# Patient Record
Sex: FEMALE | Race: WHITE | NOT HISPANIC OR LATINO | Employment: FULL TIME | URBAN - NONMETROPOLITAN AREA
[De-identification: names, ages, dates, MRNs, and addresses within clinical notes are randomized per-mention and may not be internally consistent; named-entity substitution may affect disease eponyms.]

---

## 2023-07-08 ENCOUNTER — HOSPITAL ENCOUNTER (EMERGENCY)
Facility: HOSPITAL | Age: 48
Discharge: HOME | End: 2023-07-08
Attending: STUDENT IN AN ORGANIZED HEALTH CARE EDUCATION/TRAINING PROGRAM | Admitting: STUDENT IN AN ORGANIZED HEALTH CARE EDUCATION/TRAINING PROGRAM
Payer: COMMERCIAL

## 2023-07-08 VITALS
BODY MASS INDEX: 24.55 KG/M2 | RESPIRATION RATE: 16 BRPM | DIASTOLIC BLOOD PRESSURE: 89 MMHG | SYSTOLIC BLOOD PRESSURE: 139 MMHG | HEIGHT: 61 IN | TEMPERATURE: 97.1 F | WEIGHT: 130 LBS | OXYGEN SATURATION: 99 % | HEART RATE: 86 BPM

## 2023-07-08 DIAGNOSIS — M54.50 LOW BACK PAIN: Primary | ICD-10-CM

## 2023-07-08 PROCEDURE — 99282 EMERGENCY DEPT VISIT SF MDM: CPT

## 2023-07-08 PROCEDURE — 96372 THER/PROPH/DIAG INJ SC/IM: CPT

## 2023-07-08 RX ORDER — ONDANSETRON 4 MG/1
4 TABLET, FILM COATED ORAL EVERY 8 HOURS PRN
COMMUNITY

## 2023-07-08 RX ORDER — PREDNISONE 20 MG/1
40 TABLET ORAL DAILY
Qty: 10 TABLET | Refills: 0 | Status: SHIPPED | OUTPATIENT
Start: 2023-07-08 | End: 2023-07-13

## 2023-07-08 RX ORDER — OXYCODONE HYDROCHLORIDE 5 MG/1
5 TABLET ORAL EVERY 6 HOURS PRN
Qty: 12 TABLET | Refills: 0 | Status: SHIPPED | OUTPATIENT
Start: 2023-07-08 | End: 2023-07-11

## 2023-07-08 RX ORDER — VENLAFAXINE HYDROCHLORIDE 150 MG/1
150 CAPSULE, EXTENDED RELEASE ORAL DAILY
COMMUNITY

## 2023-07-08 RX ORDER — HYDROXYZINE HYDROCHLORIDE 25 MG/1
1 TABLET, FILM COATED ORAL 3 TIMES DAILY PRN
COMMUNITY
Start: 2023-06-14

## 2023-07-08 RX ORDER — DEXAMETHASONE SODIUM PHOSPHATE 10 MG/ML
10 INJECTION, SOLUTION INTRAMUSCULAR; INTRAVENOUS ONCE
Status: COMPLETED | OUTPATIENT
Start: 2023-07-08 | End: 2023-07-08

## 2023-07-08 RX ORDER — CYCLOBENZAPRINE HCL 10 MG
10 TABLET ORAL ONCE
Status: COMPLETED | OUTPATIENT
Start: 2023-07-08 | End: 2023-07-08

## 2023-07-08 RX ORDER — CYCLOBENZAPRINE HCL 10 MG
10 TABLET ORAL 2 TIMES DAILY PRN
Qty: 20 TABLET | Refills: 0 | Status: SHIPPED | OUTPATIENT
Start: 2023-07-08

## 2023-07-08 RX ORDER — BUPROPION HYDROCHLORIDE 150 MG/1
150 TABLET ORAL DAILY
COMMUNITY

## 2023-07-08 RX ORDER — DEXAMETHASONE SODIUM PHOSPHATE 10 MG/ML
10 INJECTION, SOLUTION INTRAMUSCULAR; INTRAVENOUS ONCE
Status: DISCONTINUED | OUTPATIENT
Start: 2023-07-08 | End: 2023-07-08

## 2023-07-08 RX ORDER — LINACLOTIDE 290 UG/1
CAPSULE, GELATIN COATED ORAL
COMMUNITY
Start: 2023-02-12

## 2023-07-08 RX ADMIN — CYCLOBENZAPRINE HYDROCHLORIDE 10 MG: 10 TABLET, FILM COATED ORAL at 17:10

## 2023-07-08 RX ADMIN — CYCLOBENZAPRINE HYDROCHLORIDE 10 MG: 10 TABLET, FILM COATED ORAL at 17:09

## 2023-07-08 RX ADMIN — DEXAMETHASONE SODIUM PHOSPHATE 10 MG: 10 INJECTION, SOLUTION INTRAMUSCULAR; INTRAVENOUS at 17:07

## 2023-07-08 NOTE — Clinical Note
Brionna Alvarado was seen and treated in our emergency department on 7/8/2023. Diagnosis:     Debbie  is off the rest of the shift today, may return to work on return date. She may return on this date: 07/12/2023         If you have any questions or concerns, please don't hesitate to call.       Lonnie Cantu PA-C    ______________________________           _______________          _______________  Hospital Representative                              Date                                Time

## 2023-07-08 NOTE — DISCHARGE INSTRUCTIONS
NARCOTIC PAIN MEDICINE INSTRUCTIONS:    You have been prescribed a narcotic pain medicine. This type of medicine is not like other medications and you should understand additional information regarding it. You have been provided only a limited supply of this medicine and if you need more you must seen your Primary Care Provider &/or your Pain Therapist, if you have one. Please read the following information. If you have any questions, please do not leave here until you have your questions answered. 1.  The pain medicine will not likely make all of your pain go away, but it will hopefully take the edge off of your pain. 2.  Use the narcotic pain medicine only as directed! Be sure to avoid taking this medicine on an empty stomach. Keep yourself well hydrated when taking this medicine. 3.  Narcotic can cause sedation (sleepiness) and delayed reactions which puts you and those around you at risk of injury or death. You must not drive any vehicle, operate any machinery, make any important/life-changing or legal decisions, participate in physical activities, drink alcohol or take any other medicines that can cause drossiness. 4.  If you feel excessively tired or unable to coordinate your activities DO NOT TAKE any more of this medication! 5. Narcotic pain medications taken regularly will cause constipation. If you need to take this medication regularly use an over-the-counter stool softener such as docusate, (Colace) or laxatives called Milk of Magnesia or Mirilax following the 's directions. 6.   While taking the narcotic pain medications keep it in a secure location in your home. Avoid leaving them in common areas where others can mistakenly take them- including unsuspecting children. Also, please understand that narcotics are a sought after "street drug," and criminals may seek to steal them from you.   If you have this medicine stolen, please contact local law enforcement and have a report of the event filed. Ask to have a copy of the report because your healthcare providers will want to review it. 7.  Any remaining tablets or pills must be disposed of carefully. Most pharmacies will accept returned tablets or pills. They will destroy them using safe methods. Do not flush them down the toilet!

## 2023-07-08 NOTE — ED PROVIDER NOTES
History  Chief Complaint   Patient presents with   • Back Pain     Sciatic pain to the right side - pt sees a chiropractor       The patient is a 57year old female, who presents to the ED with the c/o back pain with right leg pain the patient states that this has been going on for several weeks. States she initially did fall and injured her back. The patient states that she has been taking indomethacin and her tizanidine without relief. Does have an upcoming appointment with her specialist to have an ablation done of L2-L4. Back Pain  Location:  Lumbar spine  Quality:  Shooting  Radiates to:  R thigh  Timing:  Constant  Progression:  Worsening  Chronicity:  New  Relieved by:  None tried  Worsened by:  Nothing  Ineffective treatments:  None tried  Associated symptoms: no abdominal swelling, no bladder incontinence, no headaches, no leg pain, no pelvic pain and no tingling        Prior to Admission Medications   Prescriptions Last Dose Informant Patient Reported? Taking? buPROPion (WELLBUTRIN XL) 150 mg 24 hr tablet   Yes No   Sig: Take 150 mg by mouth daily   hydrOXYzine HCL (ATARAX) 25 mg tablet   Yes Yes   Sig: Take 1 tablet by mouth 3 (three) times a day as needed   linaCLOtide (Linzess) 290 MCG CAPS   Yes Yes   Sig: TAKE 1 CAPSULE BY MOUTH IN THE MORNING 30 MINUTES PRIOR TO FIRST MEAL   ondansetron (ZOFRAN) 4 mg tablet   Yes Yes   Sig: Take 4 mg by mouth every 8 (eight) hours as needed for nausea or vomiting   tiZANidine HCl (ZANAFLEX PO)   Yes Yes   Sig: Take by mouth   venlafaxine (EFFEXOR-XR) 150 mg 24 hr capsule   Yes Yes   Sig: Take 150 mg by mouth daily      Facility-Administered Medications: None       Past Medical History:   Diagnosis Date   • Back pain    • Migraines        History reviewed. No pertinent surgical history. History reviewed. No pertinent family history. I have reviewed and agree with the history as documented.     E-Cigarette/Vaping   • E-Cigarette Use Never User E-Cigarette/Vaping Substances     Social History     Tobacco Use   • Smoking status: Former     Types: Cigarettes     Passive exposure: Never   • Smokeless tobacco: Former   Vaping Use   • Vaping Use: Never used   Substance Use Topics   • Alcohol use: Not Currently   • Drug use: Yes     Types: Marijuana       Review of Systems   Genitourinary: Negative for bladder incontinence and pelvic pain. Musculoskeletal: Positive for back pain. Neurological: Negative for tingling and headaches. All other systems reviewed and are negative. Physical Exam  Physical Exam  Vitals and nursing note reviewed. Constitutional:       General: She is not in acute distress. Appearance: She is well-developed. HENT:      Head: Normocephalic and atraumatic. Right Ear: External ear normal.      Left Ear: External ear normal.   Eyes:      Extraocular Movements: Extraocular movements intact. Pupils: Pupils are equal, round, and reactive to light. Cardiovascular:      Rate and Rhythm: Normal rate and regular rhythm. Pulses: Normal pulses. Heart sounds: No murmur heard. Pulmonary:      Effort: Pulmonary effort is normal. No respiratory distress. Breath sounds: Normal breath sounds. No wheezing. Abdominal:      General: Bowel sounds are normal.      Palpations: Abdomen is soft. There is no mass. Tenderness: There is no abdominal tenderness. There is no right CVA tenderness, left CVA tenderness or rebound. Hernia: No hernia is present. Musculoskeletal:      Cervical back: Normal range of motion and neck supple. Thoracic back: Normal.      Lumbar back: Normal.      Right hip: Normal.      Left hip: Normal.      Right lower leg: Normal. No edema. Left lower leg: Normal. No edema. Skin:     General: Skin is warm and dry. Capillary Refill: Capillary refill takes less than 2 seconds. Neurological:      General: No focal deficit present.       Mental Status: She is alert and oriented to person, place, and time. Coordination: Coordination normal.   Psychiatric:         Behavior: Behavior normal.         Vital Signs  ED Triage Vitals [07/08/23 1625]   Temperature Pulse Respirations Blood Pressure SpO2   (!) 97.1 °F (36.2 °C) 91 16 139/89 97 %      Temp src Heart Rate Source Patient Position - Orthostatic VS BP Location FiO2 (%)   -- -- -- Left arm --      Pain Score       6           Vitals:    07/08/23 1625 07/08/23 1630   BP: 139/89 139/89   Pulse: 91 86         Visual Acuity      ED Medications  Medications   cyclobenzaprine (FLEXERIL) tablet 10 mg (10 mg Oral Given 7/8/23 1710)   cyclobenzaprine (FLEXERIL) tablet 10 mg (10 mg Oral Given 7/8/23 1709)   dexamethasone (PF) (DECADRON) injection 10 mg (10 mg Intramuscular Given 7/8/23 1707)       Diagnostic Studies  Results Reviewed     None                 No orders to display              Procedures  Procedures         ED Course                               SBIRT 22yo+    Flowsheet Row Most Recent Value   Initial Alcohol Screen: US AUDIT-C     1. How often do you have a drink containing alcohol? 0 Filed at: 07/08/2023 1628   2. How many drinks containing alcohol do you have on a typical day you are drinking? 0 Filed at: 07/08/2023 1628   3b. FEMALE Any Age, or MALE 65+: How often do you have 4 or more drinks on one occassion? 0 Filed at: 07/08/2023 1628   Audit-C Score 0 Filed at: 07/08/2023 1628   PEDRO: How many times in the past year have you. .. Used an illegal drug or used a prescription medication for non-medical reasons? Never Filed at: 07/08/2023 1628                    Medical Decision Making  The patient is a 57year old female, who presents to the ED with the c/o back pain with right leg pain the patient states that this has been going on for several weeks. States she initially did fall and injured her back. The patient states that she has been taking indomethacin and her tizanidine without relief.   Does have an upcoming appointment with her specialist to have an ablation done of L2-L4. Patient had no focal neurological deficits  Patient having more pain today     The patient was having pain over the last few days. States that she was walking a lot on vacation. Has upcoming appointment for an ablation with her specialist.  Is only currently taking over-the-counter medication does have tizanidine which she does not take often. She states it makes her tired. No weakness, new falls or traumas, no bowel or bladder incontinence     patient had initial imaging performed which she fell she states that she fell and landed on her buttocks. No fractures were found at that time. Risk  Prescription drug management. Disposition  Final diagnoses:   Low back pain     Time reflects when diagnosis was documented in both MDM as applicable and the Disposition within this note     Time User Action Codes Description Comment    7/8/2023  4:56 PM Nilsa Reza Add [M54.50] Low back pain       ED Disposition     ED Disposition   Discharge    Condition   Stable    Date/Time   Sat Jul 8, 2023  4:59 PM    Comment   Debbie Florez discharge to home/self care.                Follow-up Information    None         Discharge Medication List as of 7/8/2023  5:01 PM      START taking these medications    Details   cyclobenzaprine (FLEXERIL) 10 mg tablet Take 1 tablet (10 mg total) by mouth 2 (two) times a day as needed for muscle spasms, Starting Sat 7/8/2023, Normal      oxyCODONE (Roxicodone) 5 immediate release tablet Take 1 tablet (5 mg total) by mouth every 6 (six) hours as needed for severe pain for up to 3 days Max Daily Amount: 20 mg, Starting Sat 7/8/2023, Until Tue 7/11/2023 at 2359, Normal      predniSONE 20 mg tablet Take 2 tablets (40 mg total) by mouth daily for 5 days, Starting Sat 7/8/2023, Until Thu 7/13/2023, Normal         CONTINUE these medications which have NOT CHANGED    Details   hydrOXYzine HCL (ATARAX) 25 mg tablet Take 1 tablet by mouth 3 (three) times a day as needed, Starting Wed 6/14/2023, Historical Med      linaCLOtide (Linzess) 290 MCG CAPS TAKE 1 CAPSULE BY MOUTH IN THE MORNING 30 MINUTES PRIOR TO FIRST MEAL, Historical Med      ondansetron (ZOFRAN) 4 mg tablet Take 4 mg by mouth every 8 (eight) hours as needed for nausea or vomiting, Historical Med      tiZANidine HCl (ZANAFLEX PO) Take by mouth, Historical Med      venlafaxine (EFFEXOR-XR) 150 mg 24 hr capsule Take 150 mg by mouth daily, Historical Med      buPROPion (WELLBUTRIN XL) 150 mg 24 hr tablet Take 150 mg by mouth daily, Historical Med             No discharge procedures on file.     PDMP Review     None          ED Provider  Electronically Signed by           Gt Alvarado PA-C  07/08/23 2042